# Patient Record
Sex: FEMALE | Race: WHITE | NOT HISPANIC OR LATINO | Employment: UNEMPLOYED | URBAN - METROPOLITAN AREA
[De-identification: names, ages, dates, MRNs, and addresses within clinical notes are randomized per-mention and may not be internally consistent; named-entity substitution may affect disease eponyms.]

---

## 2021-06-02 ENCOUNTER — OFFICE VISIT (OUTPATIENT)
Dept: INTERNAL MEDICINE | Facility: CLINIC | Age: 46
End: 2021-06-02

## 2021-06-02 VITALS
TEMPERATURE: 98 F | HEIGHT: 65 IN | WEIGHT: 143.8 LBS | HEART RATE: 98 BPM | DIASTOLIC BLOOD PRESSURE: 73 MMHG | RESPIRATION RATE: 16 BRPM | BODY MASS INDEX: 23.96 KG/M2 | OXYGEN SATURATION: 99 % | SYSTOLIC BLOOD PRESSURE: 131 MMHG

## 2021-06-02 DIAGNOSIS — S63.637A SPRAIN OF INTERPHALANGEAL JOINT OF LEFT LITTLE FINGER, INITIAL ENCOUNTER: ICD-10-CM

## 2021-06-02 DIAGNOSIS — Z00.00 HEALTHCARE MAINTENANCE: Primary | ICD-10-CM

## 2021-06-02 PROCEDURE — 99386 PREV VISIT NEW AGE 40-64: CPT | Performed by: FAMILY MEDICINE

## 2021-06-02 NOTE — PROGRESS NOTES
2021    Patient Information  Naa [facility] [id number]      Chief Complaint:   Chief Complaint   Patient presents with   • New patient     Left pain in pinky finger on left hand           History of Present Illness:  Pt is here to [email or web address] care. Not on any meds. C/o left 5th DIP jt pain x2 wks. She was wrestling and injured her finger. No popping, swelling, ecchymosis. Pt has FROM of this jt/hand. She endorses mild soreness along this joint. She does have a hx of fx in this jt during childhood. She has been tx this with davide taping which has helped improve the pain. No further concerns/complaints.    Review of Systems   Constitutional: Negative.   HENT: Negative.    Eyes: Negative.    Cardiovascular: Negative.    Respiratory: Negative.    Endocrine: Negative.    Hematologic/Lymphatic: Negative.    Skin: Negative.    Musculoskeletal: Positive for joint pain. Negative for joint swelling.   Gastrointestinal: Negative.    Genitourinary: Negative.    Neurological: Negative.    Psychiatric/Behavioral: Negative.    Allergic/Immunologic: Negative.        Active Problems:    There is no problem list on file for this patient.        History reviewed. No pertinent past medical history.      Past Surgical History:   Procedure Laterality Date   •  SECTION           No Known Allergies      No current outpatient medications on file.      Family History   Problem Relation Age of Onset   • Diabetes Mother    • Hypertension Father    • Anxiety disorder Daughter    • Depression Daughter    • Anxiety disorder Maternal Aunt    • Depression Maternal Aunt          Social History     Socioeconomic History   • Marital status:      Spouse name: Not on file   • Number of children: Not on file   • Years of education: Not on file   • Highest education level: Not on file   Tobacco Use   • Smoking  "status: Never Smoker   • Smokeless tobacco: Never Used   Vaping Use   • Vaping Use: Never used   Substance and Sexual Activity   • Sexual activity: Defer         Physical Exam  Constitutional:       Appearance: Normal appearance. She is normal weight.   Cardiovascular:      Rate and Rhythm: Normal rate and regular rhythm.      Pulses: Normal pulses.      Heart sounds: Normal heart sounds.   Pulmonary:      Effort: Pulmonary effort is normal.      Breath sounds: Normal breath sounds.   Abdominal:      General: Abdomen is flat. Bowel sounds are normal. There is no distension.      Palpations: Abdomen is soft. There is no mass.      Tenderness: There is no abdominal tenderness.   Musculoskeletal:         General: Normal range of motion.      Comments: Left hand: FROM of all MPs jts, non-tender left 5th PIP/DIP jt, no edema, no ecchymosis.   Skin:     General: Skin is warm.      Findings: No rash.   Neurological:      General: No focal deficit present.      Mental Status: She is alert and oriented to person, place, and time. Mental status is at baseline.   Psychiatric:         Mood and Affect: Mood normal.         Behavior: Behavior normal.         Thought Content: Thought content normal.         Judgment: Judgment normal.          Vitals:    06/02/21 1316   BP: 131/73   Pulse: 98   Resp: 16   Temp: 98 °F (36.7 °C)   SpO2: 99%   Weight: 65.2 kg (143 lb 12.8 oz)   Height: 165.1 cm (65\")       Body mass index is 23.93 kg/m².       Lab/other results:        Assessment/Plan:    Diagnoses and all orders for this visit:    1. Healthcare maintenance (Primary)  -     CBC (No Diff)  -     Comprehensive Metabolic Panel  -     Hemoglobin A1c  -     Hepatitis C Antibody  -     Lipid Panel With / Chol / HDL Ratio  -     TSH  -     Urinalysis With Microscopic If Indicated (No Culture) - Urine, Clean Catch    2. Sprain of interphalangeal joint of left little finger, initial encounter       HM--counseled on healthy diet, exercise. F/u " labs, f/u gyn for pap smear, f/u mammo, vaccines UTD. F/u in 1 yr for routine annual exam.    Sprain of left 5th DIP jt--davide taping for symptomatic relief, NSAIDs, tylenol, low suspicion for fx, if symptoms do not improve in 2wks then RTC for imaging.          Procedures

## 2021-07-13 ENCOUNTER — TELEPHONE (OUTPATIENT)
Dept: INTERNAL MEDICINE | Facility: CLINIC | Age: 46
End: 2021-07-13

## 2021-07-13 DIAGNOSIS — S63.637A SPRAIN OF INTERPHALANGEAL JOINT OF LEFT LITTLE FINGER, INITIAL ENCOUNTER: Primary | ICD-10-CM

## 2021-07-13 NOTE — TELEPHONE ENCOUNTER
Caller: Naa Wyatt    Relationship: Self    Best call back number: [phone]    What orders are you requesting (i.e. lab or imaging): X RAY ON LEFT PINKY FINGER     In what timeframe would the patient need to come in: ASAP    Where will you receive your lab/imaging services: N/A    Additional notes: PATIENT STATED THAT HER AND MD COTTON HAD DISCUSSED GETTING AN X RAY ON PATIENTS PINKY FINGER AND SHE HAS CHOSEN TO GET ONE DONE.